# Patient Record
Sex: MALE | Race: WHITE | NOT HISPANIC OR LATINO | Employment: FULL TIME | URBAN - METROPOLITAN AREA
[De-identification: names, ages, dates, MRNs, and addresses within clinical notes are randomized per-mention and may not be internally consistent; named-entity substitution may affect disease eponyms.]

---

## 2024-04-01 ENCOUNTER — OFFICE VISIT (OUTPATIENT)
Dept: INTERNAL MEDICINE | Facility: CLINIC | Age: 44
End: 2024-04-01
Payer: COMMERCIAL

## 2024-04-01 VITALS
OXYGEN SATURATION: 98 % | DIASTOLIC BLOOD PRESSURE: 72 MMHG | RESPIRATION RATE: 14 BRPM | SYSTOLIC BLOOD PRESSURE: 118 MMHG | HEIGHT: 68 IN | WEIGHT: 172 LBS | BODY MASS INDEX: 26.07 KG/M2 | HEART RATE: 76 BPM

## 2024-04-01 DIAGNOSIS — F32.A DEPRESSION, UNSPECIFIED DEPRESSION TYPE: ICD-10-CM

## 2024-04-01 DIAGNOSIS — Z00.00 VISIT FOR WELL MAN HEALTH CHECK: Primary | ICD-10-CM

## 2024-04-01 DIAGNOSIS — E78.5 HYPERLIPIDEMIA, UNSPECIFIED HYPERLIPIDEMIA TYPE: ICD-10-CM

## 2024-04-01 DIAGNOSIS — Z00.00 HEALTHCARE MAINTENANCE: ICD-10-CM

## 2024-04-01 DIAGNOSIS — J45.909 MODERATE ASTHMA, UNSPECIFIED WHETHER COMPLICATED, UNSPECIFIED WHETHER PERSISTENT: ICD-10-CM

## 2024-04-01 RX ORDER — MULTIVITAMIN WITH IRON
TABLET ORAL
COMMUNITY

## 2024-04-01 RX ORDER — GINSENG 100 MG
CAPSULE ORAL
COMMUNITY

## 2024-04-01 RX ORDER — DIPHENOXYLATE HYDROCHLORIDE AND ATROPINE SULFATE 2.5; .025 MG/1; MG/1
1 TABLET ORAL DAILY
COMMUNITY

## 2024-04-01 RX ORDER — ALBUTEROL SULFATE 90 UG/1
2 AEROSOL, METERED RESPIRATORY (INHALATION) EVERY 6 HOURS PRN
Qty: 18 G | Refills: 11 | Status: SHIPPED | OUTPATIENT
Start: 2024-04-01

## 2024-04-01 RX ORDER — CHOLECALCIFEROL (VITAMIN D3) 50 MCG
TABLET ORAL
COMMUNITY

## 2024-04-01 RX ORDER — CETIRIZINE HYDROCHLORIDE 10 MG/1
10 TABLET ORAL
COMMUNITY

## 2024-04-01 RX ORDER — ROSUVASTATIN CALCIUM 10 MG/1
10 TABLET, COATED ORAL DAILY
COMMUNITY
Start: 2023-09-28 | End: 2024-04-01 | Stop reason: SDUPTHER

## 2024-04-01 RX ORDER — MULTIVIT-MIN/IRON/FOLIC ACID/K 18-600-40
CAPSULE ORAL
COMMUNITY

## 2024-04-01 RX ORDER — ROSUVASTATIN CALCIUM 10 MG/1
10 TABLET, COATED ORAL DAILY
Qty: 90 TABLET | Refills: 3 | Status: SHIPPED | OUTPATIENT
Start: 2024-04-01

## 2024-04-01 RX ORDER — BUDESONIDE AND FORMOTEROL FUMARATE DIHYDRATE 160; 4.5 UG/1; UG/1
2 AEROSOL RESPIRATORY (INHALATION)
Qty: 10.2 G | Refills: 12 | Status: SHIPPED | OUTPATIENT
Start: 2024-04-01

## 2024-04-01 RX ORDER — BUPROPION HYDROBROMIDE 174 MG/1
1 TABLET, EXTENDED RELEASE ORAL DAILY
Qty: 30 TABLET | Refills: 11 | Status: SHIPPED | OUTPATIENT
Start: 2024-04-01

## 2024-04-03 LAB
ALBUMIN SERPL-MCNC: 4.8 G/DL (ref 3.5–5.2)
ALBUMIN/GLOB SERPL: 2 G/DL
ALP SERPL-CCNC: 123 U/L (ref 39–117)
ALT SERPL-CCNC: 33 U/L (ref 1–41)
AST SERPL-CCNC: 19 U/L (ref 1–40)
BASOPHILS # BLD AUTO: 0.05 10*3/MM3 (ref 0–0.2)
BASOPHILS NFR BLD AUTO: 0.7 % (ref 0–1.5)
BILIRUB SERPL-MCNC: 0.7 MG/DL (ref 0–1.2)
BUN SERPL-MCNC: 14 MG/DL (ref 6–20)
BUN/CREAT SERPL: 13.5 (ref 7–25)
CALCIUM SERPL-MCNC: 10 MG/DL (ref 8.6–10.5)
CHLORIDE SERPL-SCNC: 102 MMOL/L (ref 98–107)
CHOLEST SERPL-MCNC: 170 MG/DL (ref 0–200)
CO2 SERPL-SCNC: 25.8 MMOL/L (ref 22–29)
CREAT SERPL-MCNC: 1.04 MG/DL (ref 0.76–1.27)
EGFRCR SERPLBLD CKD-EPI 2021: 91.9 ML/MIN/1.73
EOSINOPHIL # BLD AUTO: 0.07 10*3/MM3 (ref 0–0.4)
EOSINOPHIL NFR BLD AUTO: 1 % (ref 0.3–6.2)
ERYTHROCYTE [DISTWIDTH] IN BLOOD BY AUTOMATED COUNT: 12.3 % (ref 12.3–15.4)
FT4I SERPL CALC-MCNC: 1.9 (ref 1.2–4.9)
GLOBULIN SER CALC-MCNC: 2.4 GM/DL
GLUCOSE SERPL-MCNC: 93 MG/DL (ref 65–99)
HBA1C MFR BLD: 5.5 % (ref 4.8–5.6)
HCT VFR BLD AUTO: 50 % (ref 37.5–51)
HCV IGG SERPL QL IA: NON REACTIVE
HDLC SERPL-MCNC: 41 MG/DL (ref 40–60)
HGB BLD-MCNC: 17 G/DL (ref 13–17.7)
IMM GRANULOCYTES # BLD AUTO: 0.02 10*3/MM3 (ref 0–0.05)
IMM GRANULOCYTES NFR BLD AUTO: 0.3 % (ref 0–0.5)
LDLC SERPL CALC-MCNC: 109 MG/DL (ref 0–100)
LDLC/HDLC SERPL: 2.61 {RATIO}
LYMPHOCYTES # BLD AUTO: 1.55 10*3/MM3 (ref 0.7–3.1)
LYMPHOCYTES NFR BLD AUTO: 22.5 % (ref 19.6–45.3)
MCH RBC QN AUTO: 29.1 PG (ref 26.6–33)
MCHC RBC AUTO-ENTMCNC: 34 G/DL (ref 31.5–35.7)
MCV RBC AUTO: 85.6 FL (ref 79–97)
MONOCYTES # BLD AUTO: 0.47 10*3/MM3 (ref 0.1–0.9)
MONOCYTES NFR BLD AUTO: 6.8 % (ref 5–12)
NEUTROPHILS # BLD AUTO: 4.73 10*3/MM3 (ref 1.7–7)
NEUTROPHILS NFR BLD AUTO: 68.7 % (ref 42.7–76)
NRBC BLD AUTO-RTO: 0 /100 WBC (ref 0–0.2)
PLATELET # BLD AUTO: 218 10*3/MM3 (ref 140–450)
POTASSIUM SERPL-SCNC: 4.2 MMOL/L (ref 3.5–5.2)
PROT SERPL-MCNC: 7.2 G/DL (ref 6–8.5)
RBC # BLD AUTO: 5.84 10*6/MM3 (ref 4.14–5.8)
SODIUM SERPL-SCNC: 142 MMOL/L (ref 136–145)
T3RU NFR SERPL: 25 % (ref 24–39)
T4 SERPL-MCNC: 7.4 UG/DL (ref 4.5–12)
TESTOST SERPL-MCNC: 340 NG/DL (ref 264–916)
TESTOSTERONE.FREE+WB MFR SERPL: 21.2 % (ref 9–46)
TESTOSTERONE.FREE+WB SERPL-MCNC: 72.1 NG/DL (ref 40–250)
TRIGL SERPL-MCNC: 109 MG/DL (ref 0–150)
TSH SERPL DL<=0.005 MIU/L-ACNC: 1.57 UIU/ML (ref 0.45–4.5)
VLDLC SERPL CALC-MCNC: 20 MG/DL (ref 5–40)
WBC # BLD AUTO: 6.89 10*3/MM3 (ref 3.4–10.8)

## 2024-04-05 ENCOUNTER — PATIENT ROUNDING (BHMG ONLY) (OUTPATIENT)
Dept: INTERNAL MEDICINE | Facility: CLINIC | Age: 44
End: 2024-04-05
Payer: COMMERCIAL

## 2024-04-05 NOTE — PROGRESS NOTES
April 5, 2024    Hello, may I speak with Mansoor James?    My name is erika      I am  with [facility]  [address]  [place] [address] [phone].    Before we get started may I verify your date of birth? 1980    I am calling to officially welcome you to our practice and ask about your recent visit. Is this a good time to talk? yes    Tell me about your visit with us. What things went well?  Yes thank you for asking.        We're always looking for ways to make our patients' experiences even better. Do you have recommendations on ways we may improve?  no    Overall were you satisfied with your first visit to our practice? yes       I appreciate you taking the time to speak with me today. Is there anything else I can do for you? no      Thank you, and have a great day.

## 2024-04-12 NOTE — PROGRESS NOTES
"Chief Complaint  Establish Care    Subjective        Mansoor James presents to [facility]  History of Present Illness    Patient presents at today's visit with a history of having hyperlipidemia in the past.  He has not been consistent taking the medication.  In the past he did take Crestor.    Patient does have a history of having some moderate asthma.  We did discuss about perhaps getting started on some medication to help him with this.  He does have numerous episodes of coughing and nighttime specifically multiple times a week.    He does note that at times that he does feel a bit depressed.  We did discuss about perhaps getting started on some medication to help with the depression.    Objective   Vital Signs:  /72 (BP Location: Left arm, Patient Position: Sitting, Cuff Size: Adult)   Pulse 76   Resp 14   Ht 172.7 cm (68\")   Wt 78 kg (172 lb)   SpO2 98%   BMI 26.15 kg/m²   Estimated body mass index is 26.15 kg/m² as calculated from the following:    Height as of this encounter: 172.7 cm (68\").    Weight as of this encounter: 78 kg (172 lb).             Physical Exam  Vitals and nursing note reviewed.   Constitutional:       Appearance: He is well-developed.   HENT:      Head: Normocephalic and atraumatic.   Musculoskeletal:      Cervical back: Normal range of motion and neck supple.   Neurological:      Mental Status: He is alert and oriented to person, place, and time.   Psychiatric:         Behavior: Behavior normal.        Result Review :                     Assessment and Plan     Diagnoses and all orders for this visit:    1. Visit for well man health check (Primary)    2. Healthcare maintenance  -     CBC & Differential  -     Comprehensive Metabolic Panel  -     Hemoglobin A1c  -     Thyroid Panel With TSH  -     Lipid Panel With LDL / HDL Ratio  -     Hepatitis C Antibody  -     Testosterone,Free+Weakly Bound    3. Hyperlipidemia, unspecified hyperlipidemia " type  -     rosuvastatin (CRESTOR) 10 MG tablet; Take 1 tablet by mouth Daily.  Dispense: 90 tablet; Refill: 3  -     Comprehensive Metabolic Panel  -     Lipid Panel With LDL / HDL Ratio    4. Moderate asthma, unspecified whether complicated, unspecified whether persistent  -     albuterol sulfate  (90 Base) MCG/ACT inhaler; Inhale 2 puffs Every 6 (Six) Hours As Needed for Wheezing or Shortness of Air.  Dispense: 18 g; Refill: 11  -     budesonide-formoterol (Symbicort) 160-4.5 MCG/ACT inhaler; Inhale 2 puffs 2 (Two) Times a Day.  Dispense: 10.2 g; Refill: 12    5. Depression, unspecified depression type  -     Aplenzin 174 MG tablet sustained-release 24 hour; Take 1 tablet by mouth Daily.  Dispense: 30 tablet; Refill: 11    Other orders  -     Testosterone,Free+Weakly Bound  -     Hepatitis C Antibody    For the depression we will start him on Aplenzin 174 mg daily.  For the hyperlipidemia, we will continue him to restart Crestor 10 mg daily.  For his moderate asthma I think he would benefit from doing Symbicort daily and albuterol as needed.         Follow Up     No follow-ups on file.  Patient was given instructions and counseling regarding his condition or for health maintenance advice. Please see specific information pulled into the AVS if appropriate.

## 2024-04-12 NOTE — PROGRESS NOTES
Subjective   Mansoor James is a 43 y.o. male and is here for a comprehensive physical exam. The patient reports no problems.            Social History:   Social History     Socioeconomic History    Marital status:    Tobacco Use    Smoking status: Never   Vaping Use    Vaping status: Never Used   Substance and Sexual Activity    Alcohol use: Not Currently     Alcohol/week: 2.0 standard drinks of alcohol     Types: 1 Glasses of wine, 1 Cans of beer per week     Comment: Often I don't have alcohol at all thru the week    Drug use: Never    Sexual activity: Yes     Partners: Female     Birth control/protection: Tubal ligation       Family History:   Family History   Problem Relation Age of Onset    Arthritis Mother     Depression Mother     Hyperlipidemia Father        Past Medical History:   Past Medical History:   Diagnosis Date    Allergic     Anxiety 2001    Asthma 2022    Depression 2001    Erectile dysfunction 2022-23    Hyperlipidemia 2023    Kidney stone 2019       The following portions of the patient's history were reviewed and updated as appropriate: allergies, current medications, past family history, past medical history, past social history, past surgical history and problem list.    Review of Systems    Review of Systems   Constitutional:  Negative for chills and fever.   HENT:  Negative for congestion, rhinorrhea, sinus pain and sore throat.    Eyes:  Negative for photophobia and visual disturbance.   Respiratory:  Negative for cough, chest tightness and shortness of breath.    Cardiovascular:  Negative for chest pain and palpitations.   Gastrointestinal:  Negative for diarrhea, nausea and vomiting.   Genitourinary:  Negative for dysuria, frequency and urgency.   Skin:  Negative for rash and wound.   Neurological:  Negative for dizziness and syncope.   Psychiatric/Behavioral:  Negative for behavioral problems and confusion.        Objective   Physical Exam  Vitals and nursing note reviewed.    Constitutional:       Appearance: He is well-developed.   HENT:      Head: Normocephalic and atraumatic.      Right Ear: External ear normal.      Left Ear: External ear normal.   Cardiovascular:      Rate and Rhythm: Normal rate and regular rhythm.      Heart sounds: Normal heart sounds.   Pulmonary:      Effort: Pulmonary effort is normal. No respiratory distress.      Breath sounds: Normal breath sounds.   Abdominal:      Palpations: Abdomen is soft.      Tenderness: There is no abdominal tenderness. There is no guarding.   Musculoskeletal:         General: Normal range of motion.      Cervical back: Normal range of motion and neck supple.   Lymphadenopathy:      Cervical: No cervical adenopathy.   Skin:     General: Skin is warm.   Neurological:      Mental Status: He is alert and oriented to person, place, and time.   Psychiatric:         Behavior: Behavior normal.         Vitals:    04/01/24 0850   BP: 118/72   Pulse: 76   Resp: 14   SpO2: 98%     Body mass index is 26.15 kg/m².    Medications:   Current Outpatient Medications:     albuterol sulfate  (90 Base) MCG/ACT inhaler, Inhale 2 puffs Every 6 (Six) Hours As Needed for Wheezing or Shortness of Air., Disp: 18 g, Rfl: 11    Ascorbic Acid (Vitamin C) 500 MG capsule, , Disp: , Rfl:     B-Complex-C tablet, , Disp: , Rfl:     cetirizine (zyrTEC) 10 MG tablet, Take 1 tablet by mouth., Disp: , Rfl:     Cholecalciferol (D3) 50 MCG (2000 UT) tablet, , Disp: , Rfl:     multivitamin (THERAGRAN) tablet tablet, Take 1 tablet by mouth Daily., Disp: , Rfl:     rosuvastatin (CRESTOR) 10 MG tablet, Take 1 tablet by mouth Daily., Disp: 90 tablet, Rfl: 3    Zinc 50 MG tablet, , Disp: , Rfl:     Aplenzin 174 MG tablet sustained-release 24 hour, Take 1 tablet by mouth Daily., Disp: 30 tablet, Rfl: 11    budesonide-formoterol (Symbicort) 160-4.5 MCG/ACT inhaler, Inhale 2 puffs 2 (Two) Times a Day., Disp: 10.2 g, Rfl: 12       Assessment & Plan   Healthy male exam.       1. Healthcare Maintenance:  2. Patient Counseling:  --Nutrition: Stressed importance of moderation in sodium/caffeine intake, saturated fat and cholesterol, caloric balance, sufficient intake of fresh fruits, vegetables, fiber, calcium and vit D  --Exercise: Recommended 30 minutes of exercise daily.   --Immunizations reviewed.  --Discussed benefits of screening colonoscopy.    Diagnoses and all orders for this visit:    Visit for well [place] health check    Healthcare maintenance  -     CBC & Differential  -     Comprehensive Metabolic Panel  -     Hemoglobin A1c  -     Thyroid Panel With TSH  -     Lipid Panel With LDL / HDL Ratio  -     Hepatitis C Antibody  -     Testosterone,Free+Weakly Bound    Hyperlipidemia, unspecified hyperlipidemia type  -     rosuvastatin (CRESTOR) 10 MG tablet; Take 1 tablet by mouth Daily.  -     Comprehensive Metabolic Panel  -     Lipid Panel With LDL / HDL Ratio    Moderate asthma, unspecified whether complicated, unspecified whether persistent  -     albuterol sulfate  (90 Base) MCG/ACT inhaler; Inhale 2 puffs Every 6 (Six) Hours As Needed for Wheezing or Shortness of Air.  -     budesonide-formoterol (Symbicort) 160-4.5 MCG/ACT inhaler; Inhale 2 puffs 2 (Two) Times a Day.    Depression, unspecified depression type  -     Aplenzin 174 MG tablet sustained-release 24 hour; Take 1 tablet by mouth Daily.    Other orders  -     multivitamin (THERAGRAN) tablet tablet; Take 1 tablet by mouth Daily.  -     Discontinue: ALBUTEROL SULFATE HFA IN  -     Ascorbic Acid (Vitamin C) 500 MG capsule  -     B-Complex-C tablet  -     cetirizine (zyrTEC) 10 MG tablet; Take 1 tablet by mouth.  -     Cholecalciferol (D3) 50 MCG (2000 UT) tablet  -     Discontinue: rosuvastatin (CRESTOR) 10 MG tablet; Take 1 tablet by mouth Daily.  -     Zinc 50 MG tablet  -     Testosterone,Free+Weakly Bound  -     Hepatitis C Antibody        No follow-ups on file.           Dictated utilizing Dragon Voice  Recognition Software

## 2024-05-16 ENCOUNTER — OFFICE VISIT (OUTPATIENT)
Dept: INTERNAL MEDICINE | Facility: CLINIC | Age: 44
End: 2024-05-16
Payer: COMMERCIAL

## 2024-05-16 VITALS
RESPIRATION RATE: 12 BRPM | OXYGEN SATURATION: 98 % | BODY MASS INDEX: 26.31 KG/M2 | SYSTOLIC BLOOD PRESSURE: 106 MMHG | DIASTOLIC BLOOD PRESSURE: 74 MMHG | HEART RATE: 67 BPM | WEIGHT: 173.6 LBS | HEIGHT: 68 IN

## 2024-05-16 DIAGNOSIS — F32.A DEPRESSION, UNSPECIFIED DEPRESSION TYPE: Primary | ICD-10-CM

## 2024-05-16 DIAGNOSIS — J45.909 MODERATE ASTHMA, UNSPECIFIED WHETHER COMPLICATED, UNSPECIFIED WHETHER PERSISTENT: ICD-10-CM

## 2024-05-16 DIAGNOSIS — E78.5 HYPERLIPIDEMIA, UNSPECIFIED HYPERLIPIDEMIA TYPE: ICD-10-CM

## 2024-05-16 PROCEDURE — 99214 OFFICE O/P EST MOD 30 MIN: CPT | Performed by: FAMILY MEDICINE

## 2024-05-16 RX ORDER — ROSUVASTATIN CALCIUM 10 MG/1
10 TABLET, COATED ORAL DAILY
Qty: 90 TABLET | Refills: 3 | Status: SHIPPED | OUTPATIENT
Start: 2024-05-16

## 2024-05-16 RX ORDER — BUDESONIDE AND FORMOTEROL FUMARATE DIHYDRATE 160; 4.5 UG/1; UG/1
2 AEROSOL RESPIRATORY (INHALATION)
Qty: 10.2 G | Refills: 12 | Status: SHIPPED | OUTPATIENT
Start: 2024-05-16

## 2024-05-21 NOTE — PROGRESS NOTES
"Chief Complaint  Follow-up    Subjective          Mansoor James presents to [facility]  History of Present Illness  The patient is a 44-year-old male who presents for evaluation of multiple medical concerns. He is accompanied by his wife.    The patient was initiated on Plenzin during his last visit, however, due to insurance constraints, the medication was subsequently rejected. He has chosen to maintain the current treatment regimen until further notice. His mood has shown improvement, and he reports fewer side effects. He has been diligently working on mental conditioning to manage his interests.    The patient experiences occasional hoarseness, despite swishing and gargling. He is currently on Symbicort, taking 2 puffs twice daily, which he reports has significantly improved his breathing over the past 6 weeks. The hoarseness is not painful, but rather an annoyance. Initially, the hoarseness was worse, but it has since improved. His insurance is providing coverage for Symbicort.    The patient is currently on Crestor for cholesterol management, which he tolerates well. He administers Crestor on Mondays, Wednesdays, and Fridays. He has made lifestyle modifications, including healthier eating habits and regular exercise. He expresses a desire to maintain this regimen regardless of the medication. His testosterone levels were on the lower end of the normal range during the last blood work.    Objective   Vital Signs:   /74 (BP Location: Left arm, Patient Position: Sitting, Cuff Size: Adult)   Pulse 67   Resp 12   Ht 172.7 cm (68\")   Wt 78.7 kg (173 lb 9.6 oz)   SpO2 98%   BMI 26.40 kg/m²     Physical Exam  Vitals and nursing note reviewed.   Constitutional:       Appearance: He is well-developed.   HENT:      Head: Normocephalic and atraumatic.   Musculoskeletal:      Cervical back: Normal range of motion and neck supple.   Neurological:      Mental Status: He is alert " and oriented to person, place, and time.   Psychiatric:         Behavior: Behavior normal.         Physical Exam       Result Review :                 Assessment and Plan    Diagnoses and all orders for this visit:    1. Depression, unspecified depression type (Primary)    2. Moderate asthma, unspecified whether complicated, unspecified whether persistent  -     budesonide-formoterol (Symbicort) 160-4.5 MCG/ACT inhaler; Inhale 2 puffs 2 (Two) Times a Day.  Dispense: 10.2 g; Refill: 12    3. Hyperlipidemia, unspecified hyperlipidemia type  -     rosuvastatin (CRESTOR) 10 MG tablet; Take 1 tablet by mouth Daily.  Dispense: 90 tablet; Refill: 3  -     CBC & Differential; Future  -     Comprehensive Metabolic Panel; Future  -     Lipid Panel With LDL / HDL Ratio; Future      Assessment & Plan  1. Depression.  The patient has discontinued the use of Plenzin. Consequently, we will defer any pharmacological intervention for depression at this juncture.    2. Moderate asthma.  The patient's respiratory condition has shown significant improvement with the use of Symbicort. The patient is advised to persist with the Symbicort regimen.    3. Hypercholesterolemia.  The patient is recommended to continue with the Crestor regimen on Mondays, Wednesdays, and Fridays.    Follow-up  The patient is scheduled for a follow-up visit in 6 months, during which blood work will be conducted.    Follow Up   No follow-ups on file.  Patient was given instructions and counseling regarding his condition or for health maintenance advice. Please see specific information pulled into the AVS if appropriate.           Patient or patient representative verbalized consent for the use of Ambient Listening during the visit with  Eric Cruz MD for chart documentation. 5/21/2024  16:16 EDT

## 2024-08-01 ENCOUNTER — OFFICE VISIT (OUTPATIENT)
Dept: INTERNAL MEDICINE | Facility: CLINIC | Age: 44
End: 2024-08-01
Payer: COMMERCIAL

## 2024-08-01 VITALS
BODY MASS INDEX: 26.52 KG/M2 | SYSTOLIC BLOOD PRESSURE: 110 MMHG | WEIGHT: 175 LBS | HEIGHT: 68 IN | HEART RATE: 91 BPM | RESPIRATION RATE: 14 BRPM | DIASTOLIC BLOOD PRESSURE: 70 MMHG | OXYGEN SATURATION: 99 %

## 2024-08-01 DIAGNOSIS — F32.A DEPRESSION, UNSPECIFIED DEPRESSION TYPE: Primary | ICD-10-CM

## 2024-08-01 DIAGNOSIS — F41.9 ANXIETY: ICD-10-CM

## 2024-08-01 PROCEDURE — 99214 OFFICE O/P EST MOD 30 MIN: CPT | Performed by: FAMILY MEDICINE

## 2024-08-01 RX ORDER — BUPROPION HYDROCHLORIDE 150 MG/1
150 TABLET ORAL DAILY
Qty: 90 TABLET | Refills: 3 | Status: SHIPPED | OUTPATIENT
Start: 2024-08-01

## 2024-08-04 NOTE — PROGRESS NOTES
"Chief Complaint  Depression    Subjective          Mansoor James presents to [facility]  History of Present Illness  The patient presents for evaluation of anxiety.    He is seeking to resume medication for his anxiety, which has been causing him significant distress over the past few months. He began medication after 09/11/2023 due to concerns about his academic performance and home life. Initially, he was prescribed citalopram 20 mg by Dr. Augustine, which he took for several years. However, he gradually reduced his dosage towards the end of 2023, which led to some positive effects. In 2023, he was also on bupropion, Wellbutrin, and escitalopram 10 mg. However, these medications caused significant fatigue and sexual performance issues, which he initially attributed to these medications. Despite these issues, he was able to read, listen to dark music, and watch dark movies without experiencing significant stress. However, he now worries about potential family issues, particularly about leaving his 11-year-old child behind him. He finds it challenging to move around in the morning.    Objective   Vital Signs:   /70 (BP Location: Left arm, Patient Position: Sitting, Cuff Size: Adult)   Pulse 91   Resp 14   Ht 172.7 cm (68\")   Wt 79.4 kg (175 lb)   SpO2 99%   BMI 26.61 kg/m²     Physical Exam  Vitals and nursing note reviewed.   Constitutional:       Appearance: He is well-developed.   HENT:      Head: Normocephalic and atraumatic.   Musculoskeletal:      Cervical back: Normal range of motion and neck supple.   Neurological:      Mental Status: He is alert and oriented to person, place, and time.   Psychiatric:         Behavior: Behavior normal.         Physical Exam       Result Review :                 Assessment and Plan    Diagnoses and all orders for this visit:    1. Depression, unspecified depression type (Primary)  -     buPROPion XL (Wellbutrin XL) 150 MG 24 hr tablet; " Take 1 tablet by mouth Daily.  Dispense: 90 tablet; Refill: 3    2. Anxiety  -     buPROPion XL (Wellbutrin XL) 150 MG 24 hr tablet; Take 1 tablet by mouth Daily.  Dispense: 90 tablet; Refill: 3      Assessment & Plan  1. Anxiety and depression  The patient's symptoms and history suggest anxiety. A prescription for Wellbutrin extended release 150 mg has been issued.    Follow-up  A follow-up appointment is scheduled for 6 weeks from now.    Follow Up   No follow-ups on file.  Patient was given instructions and counseling regarding his condition or for health maintenance advice. Please see specific information pulled into the AVS if appropriate.           Patient or patient representative verbalized consent for the use of Ambient Listening during the visit with  Eric Cruz MD for chart documentation. 8/4/2024  10:03 EDT

## 2024-09-12 ENCOUNTER — OFFICE VISIT (OUTPATIENT)
Dept: INTERNAL MEDICINE | Facility: CLINIC | Age: 44
End: 2024-09-12
Payer: COMMERCIAL

## 2024-09-12 VITALS
OXYGEN SATURATION: 97 % | WEIGHT: 167.8 LBS | BODY MASS INDEX: 25.43 KG/M2 | TEMPERATURE: 98.3 F | DIASTOLIC BLOOD PRESSURE: 70 MMHG | SYSTOLIC BLOOD PRESSURE: 122 MMHG | RESPIRATION RATE: 16 BRPM | HEART RATE: 83 BPM | HEIGHT: 68 IN

## 2024-09-12 DIAGNOSIS — F41.9 ANXIETY: ICD-10-CM

## 2024-09-12 DIAGNOSIS — F32.A DEPRESSION, UNSPECIFIED DEPRESSION TYPE: Primary | ICD-10-CM

## 2024-09-12 DIAGNOSIS — R07.89 ATYPICAL CHEST PAIN: ICD-10-CM

## 2024-09-12 PROCEDURE — 99214 OFFICE O/P EST MOD 30 MIN: CPT | Performed by: FAMILY MEDICINE

## 2024-09-12 RX ORDER — BUPROPION HYDROCHLORIDE 150 MG/1
150 TABLET ORAL DAILY
Qty: 90 TABLET | Refills: 3 | Status: SHIPPED | OUTPATIENT
Start: 2024-09-12

## 2024-09-12 NOTE — PROGRESS NOTES
"Chief Complaint  Follow-up and Depression (F/u on New meds -Wellbutrin which was started last visit. Pt is doing better than before.C/o of vague discomfort in the left shoulder and left upper limb)    Subjective          Mansoor James presents to [facility]  History of Present Illness  The patient is a 44-year-old male who presents for evaluation of multiple medical concerns.    He reports significant improvement in his condition, attributing it to the bupropion he has been taking for approximately 6 weeks. He describes feeling lighter and more capable of managing unpleasant situations. His sleep quality has improved, although insomnia was not a previous concern. Upon waking, he feels refreshed. His appetite remains normal, and he does not exhibit any disinterest in activities. He also mentions that the medication is affordable.    He experiences occasional localized sensations on the left side of his chest, which are neither sharp nor dull, and only mildly painful. Over the past few months, he has noticed this sensation twice, accompanied by numbness in the tips of two fingers. He occasionally feels similar sensations on the right side, but without significant numbness or dizziness.    Objective   Vital Signs:   /70 (BP Location: Right arm, Patient Position: Sitting, Cuff Size: Adult)   Pulse 83   Temp 98.3 °F (36.8 °C) (Temporal)   Resp 16   Ht 172.7 cm (67.99\")   Wt 76.1 kg (167 lb 12.8 oz)   SpO2 97%   BMI 25.52 kg/m²     Physical Exam  Vitals and nursing note reviewed.   Constitutional:       Appearance: He is well-developed.   HENT:      Head: Normocephalic and atraumatic.   Musculoskeletal:      Cervical back: Normal range of motion and neck supple.   Neurological:      Mental Status: He is alert and oriented to person, place, and time.   Psychiatric:         Behavior: Behavior normal.         Physical Exam       Result Review :                 Assessment and Plan  "   Diagnoses and all orders for this visit:    1. Depression, unspecified depression type (Primary)  -     buPROPion XL (Wellbutrin XL) 150 MG 24 hr tablet; Take 1 tablet by mouth Daily.  Dispense: 90 tablet; Refill: 3    2. Anxiety  -     buPROPion XL (Wellbutrin XL) 150 MG 24 hr tablet; Take 1 tablet by mouth Daily.  Dispense: 90 tablet; Refill: 3    3. Atypical chest pain      Assessment & Plan  1. Depression.  He reports significant improvement in his symptoms since starting bupropion  mg daily. He feels lighter and better able to manage stressors. His sleep and appetite are stable, and he does not experience disinterest in activities. The current regimen of bupropion  mg daily will be continued indefinitely. If he wishes to discontinue the medication in the future, alternative treatment options can be explored.    2. Chest pain.  The intermittent, mild chest pain described is likely musculoskeletal in origin. There are no associated symptoms such as significant numbness, dizziness, or shortness of breath. He is advised to monitor his symptoms. If the chest pain persists or worsens, further diagnostic work-up may be considered.    Follow-up  A follow-up appointment is scheduled for January 2025.    Follow Up   No follow-ups on file.  Patient was given instructions and counseling regarding his condition or for health maintenance advice. Please see specific information pulled into the AVS if appropriate.           Patient or patient representative verbalized consent for the use of Ambient Listening during the visit with  Eric Cruz MD for chart documentation. 9/12/2024  08:37 EDT

## 2025-05-02 DIAGNOSIS — J45.909 MODERATE ASTHMA, UNSPECIFIED WHETHER COMPLICATED, UNSPECIFIED WHETHER PERSISTENT: ICD-10-CM

## 2025-05-05 RX ORDER — BUDESONIDE AND FORMOTEROL FUMARATE DIHYDRATE 160; 4.5 UG/1; UG/1
2 AEROSOL RESPIRATORY (INHALATION) 2 TIMES DAILY
Qty: 10.2 G | Refills: 12 | OUTPATIENT
Start: 2025-05-05

## 2025-05-06 DIAGNOSIS — J45.909 MODERATE ASTHMA, UNSPECIFIED WHETHER COMPLICATED, UNSPECIFIED WHETHER PERSISTENT: ICD-10-CM

## 2025-05-06 RX ORDER — BUDESONIDE AND FORMOTEROL FUMARATE DIHYDRATE 160; 4.5 UG/1; UG/1
2 AEROSOL RESPIRATORY (INHALATION)
Qty: 10.2 G | Refills: 12 | Status: SHIPPED | OUTPATIENT
Start: 2025-05-06